# Patient Record
Sex: FEMALE | Race: BLACK OR AFRICAN AMERICAN | NOT HISPANIC OR LATINO | URBAN - METROPOLITAN AREA
[De-identification: names, ages, dates, MRNs, and addresses within clinical notes are randomized per-mention and may not be internally consistent; named-entity substitution may affect disease eponyms.]

---

## 2024-04-11 NOTE — PROGRESS NOTES
Subjective     Elvia Remy is a 23 y.o. female who presents for the following: Acne.     New patient in for virtual appointment for acne.     Review of Systems:  No other skin or systemic complaints other than what is documented elsewhere in the note.    The following portions of the chart were reviewed this encounter and updated as appropriate:       Skin Cancer History  No skin cancer on file.    Specialty Problems    None    Past Medical History:  Elvia Remy  has no past medical history on file.    Past Surgical History:  Elvia Remy  has no past surgical history on file.    Family History:  Patient family history is not on file.    Social History:  Elvia Remy  has no history on file for tobacco use, alcohol use, and drug use.    Allergies:  Patient has no known allergies.    Current Medications / CAM's:    Current Outpatient Medications:     clindamycin (Cleocin T) 1 % lotion, Apply topically once daily., Disp: 60 mL, Rfl: 1    tretinoin (Retin-A) 0.05 % cream, Apply topically once daily at bedtime. Apply a pea size amount nightly to entire face. Moisturize as needed, Disp: 20 g, Rfl: 1     Objective   Well appearing patient in no apparent distress; mood and affect are within normal limits.        Assessment/Plan   1. Acne vulgaris  Head - Anterior (Face)  Scattered comedones and inflammatory papulopustules.    Maintenance of Current Regimen: Please continue using the prescribed topical medications as directed. Consistency is key to maintaining the improvement achieved so far.    PLAN:  Start tretinoin 0.05% cream nightly  Start clindamycin 1% lotion daily   Continue OTC Panoxyl wash 10%  RTC in 2 months       Further Treatment Options: If there are no significant improvements or if your acne worsens, we may consider additional treatment options such as oral medications or in-office procedures. However, it is important to note that most cases of mild acne can be managed effectively with the current  regimen.    Skin Care Tips: Continue following a gentle cleansing routine, avoiding harsh scrubbing, and using non-comedogenic moisturizers to keep your skin hydrated without clogging pores.    Cleansing Routine: Gentle Cleanser: You have been using a mild, non-comedogenic cleanser to wash your face twice daily. This practice helps to remove excess oil, dirt, and impurities from your skin, minimizing the risk of pore blockage.    Lifestyle Measures: Avoiding Trigger Factors: We also discussed avoiding known triggers such as excessive sun exposure, touching or picking at the acne lesions, and using heavy or comedogenic cosmetic products.    Please feel free to contact our clinic if you have any questions or concerns between appointments. Remember, acne treatment requires patience and consistency.                 clindamycin (Cleocin T) 1 % lotion - Head - Anterior (Face)  Apply topically once daily.    Related Procedures  Follow Up In Dermatology - Established Patient    Related Medications  tretinoin (Retin-A) 0.05 % cream  Apply topically once daily at bedtime. Apply a pea size amount nightly to entire face. Moisturize as needed

## 2024-04-12 ENCOUNTER — TELEMEDICINE (OUTPATIENT)
Dept: DERMATOLOGY | Facility: CLINIC | Age: 24
End: 2024-04-12
Payer: COMMERCIAL

## 2024-04-12 DIAGNOSIS — L70.0 ACNE VULGARIS: Primary | ICD-10-CM

## 2024-04-12 PROCEDURE — 99204 OFFICE O/P NEW MOD 45 MIN: CPT | Performed by: NURSE PRACTITIONER

## 2024-04-12 RX ORDER — CLINDAMYCIN PHOSPHATE 10 UG/ML
LOTION TOPICAL DAILY
Qty: 60 ML | Refills: 1 | Status: SHIPPED | OUTPATIENT
Start: 2024-04-12

## 2024-04-12 RX ORDER — TRETINOIN 0.5 MG/G
CREAM TOPICAL NIGHTLY
Qty: 20 G | Refills: 1 | Status: SHIPPED | OUTPATIENT
Start: 2024-04-12 | End: 2025-04-12

## 2024-04-12 ASSESSMENT — DERMATOLOGY QUALITY OF LIFE (QOL) ASSESSMENT
WHAT SINGLE SKIN CONDITION LISTED BELOW IS THE PATIENT ANSWERING THE QUALITY-OF-LIFE ASSESSMENT QUESTIONS ABOUT: ACNE
RATE HOW EMOTIONALLY BOTHERED YOU ARE BY YOUR SKIN PROBLEM (FOR EXAMPLE, WORRY, EMBARRASSMENT, FRUSTRATION): 2
RATE HOW BOTHERED YOU ARE BY EFFECTS OF YOUR SKIN PROBLEMS ON YOUR ACTIVITIES (EG, GOING OUT, ACCOMPLISHING WHAT YOU WANT, WORK ACTIVITIES OR YOUR RELATIONSHIPS WITH OTHERS): 2
RATE HOW BOTHERED YOU ARE BY SYMPTOMS OF YOUR SKIN PROBLEM (EG, ITCHING, STINGING BURNING, HURTING OR SKIN IRRITATION): 1
DATE THE QUALITY-OF-LIFE ASSESSMENT WAS COMPLETED: 66942
RATE HOW BOTHERED YOU ARE BY SYMPTOMS OF YOUR SKIN PROBLEM (EG, ITCHING, STINGING BURNING, HURTING OR SKIN IRRITATION): 1
RATE HOW BOTHERED YOU ARE BY EFFECTS OF YOUR SKIN PROBLEMS ON YOUR ACTIVITIES (EG, GOING OUT, ACCOMPLISHING WHAT YOU WANT, WORK ACTIVITIES OR YOUR RELATIONSHIPS WITH OTHERS): 2
RATE HOW EMOTIONALLY BOTHERED YOU ARE BY YOUR SKIN PROBLEM (FOR EXAMPLE, WORRY, EMBARRASSMENT, FRUSTRATION): 2
WHAT SINGLE SKIN CONDITION LISTED BELOW IS THE PATIENT ANSWERING THE QUALITY-OF-LIFE ASSESSMENT QUESTIONS ABOUT: ACNE

## 2024-06-20 NOTE — PROGRESS NOTES
Subjective     Elvia Remy is a 23 y.o. female who presents for the following: Acne.   Patient last seen 04/2024 and prescribed to     Start tretinoin 0.05% cream nightly  Start clindamycin 1% lotion daily   Continue OTC Panoxyl wash 10%        Review of Systems:  No other skin or systemic complaints other than what is documented elsewhere in the note.    The following portions of the chart were reviewed this encounter and updated as appropriate:       Skin Cancer History  No skin cancer on file.    Specialty Problems    None    Past Medical History:  Elvia Remy  has no past medical history on file.    Past Surgical History:  Elvia Remy  has no past surgical history on file.    Family History:  Patient family history is not on file.    Social History:  Elvia Remy  has no history on file for tobacco use, alcohol use, and drug use.    Allergies:  Patient has no known allergies.    Current Medications / CAM's:    Current Outpatient Medications:     clindamycin (Cleocin T) 1 % lotion, Apply topically once daily., Disp: 60 mL, Rfl: 1    tretinoin (Retin-A) 0.025 % cream, Apply topically once daily at bedtime., Disp: 20 g, Rfl: 2     Objective   Well appearing patient in no apparent distress; mood and affect are within normal limits.      Assessment/Plan   1. Acne vulgaris  Head - Anterior (Face)  Patient is overall doing well, but still is getting new open comedones because she can only tolerate her tretinoin twice weekly due to dryness.  She believes it is improved since last visit, but not clear    Maintenance of Current Regimen: Please continue using the prescribed topical medications as directed. Consistency is key to maintaining the improvement achieved so far.    PLAN:  Continue clindamycin 1% lotion   Reduce tretinoin to 0.025% from 0.05%  Continue daily moisturizer   Continue BPO 10% wash daily    Further Treatment Options: If there are no significant improvements or if your acne worsens, we may consider  additional treatment options such as oral medications or in-office procedures. However, it is important to note that most cases of mild acne can be managed effectively with the current regimen.    Skin Care Tips: Continue following a gentle cleansing routine, avoiding harsh scrubbing, and using non-comedogenic moisturizers to keep your skin hydrated without clogging pores.    Cleansing Routine: Gentle Cleanser: You have been using a mild, non-comedogenic cleanser to wash your face twice daily. This practice helps to remove excess oil, dirt, and impurities from your skin, minimizing the risk of pore blockage.    Lifestyle Measures: Avoiding Trigger Factors: We also discussed avoiding known triggers such as excessive sun exposure, touching or picking at the acne lesions, and using heavy or comedogenic cosmetic products.    Please feel free to contact our clinic if you have any questions or concerns between appointments. Remember, acne treatment requires patience and consistency.                 tretinoin (Retin-A) 0.025 % cream - Head - Anterior (Face)  Apply topically once daily at bedtime.    Related Procedures  Follow Up In Dermatology - Established Patient  Follow Up In Dermatology - Established Patient    Related Medications  clindamycin (Cleocin T) 1 % lotion  Apply topically once daily.

## 2024-06-21 ENCOUNTER — APPOINTMENT (OUTPATIENT)
Dept: DERMATOLOGY | Facility: CLINIC | Age: 24
End: 2024-06-21
Payer: COMMERCIAL

## 2024-06-21 DIAGNOSIS — L70.0 ACNE VULGARIS: ICD-10-CM

## 2024-06-21 PROCEDURE — 99213 OFFICE O/P EST LOW 20 MIN: CPT | Performed by: NURSE PRACTITIONER

## 2024-06-21 RX ORDER — CLINDAMYCIN PHOSPHATE 10 UG/ML
LOTION TOPICAL DAILY
Qty: 60 ML | Refills: 1 | Status: SHIPPED | OUTPATIENT
Start: 2024-06-21

## 2024-06-21 RX ORDER — TRETINOIN 0.25 MG/G
CREAM TOPICAL NIGHTLY
Qty: 20 G | Refills: 2 | Status: SHIPPED | OUTPATIENT
Start: 2024-06-21 | End: 2025-06-21

## 2024-06-21 ASSESSMENT — DERMATOLOGY QUALITY OF LIFE (QOL) ASSESSMENT
DATE THE QUALITY-OF-LIFE ASSESSMENT WAS COMPLETED: 67012
WHAT SINGLE SKIN CONDITION LISTED BELOW IS THE PATIENT ANSWERING THE QUALITY-OF-LIFE ASSESSMENT QUESTIONS ABOUT: ACNE
RATE HOW BOTHERED YOU ARE BY EFFECTS OF YOUR SKIN PROBLEMS ON YOUR ACTIVITIES (EG, GOING OUT, ACCOMPLISHING WHAT YOU WANT, WORK ACTIVITIES OR YOUR RELATIONSHIPS WITH OTHERS): 2
RATE HOW BOTHERED YOU ARE BY SYMPTOMS OF YOUR SKIN PROBLEM (EG, ITCHING, STINGING BURNING, HURTING OR SKIN IRRITATION): 0 - NEVER BOTHERED
RATE HOW BOTHERED YOU ARE BY EFFECTS OF YOUR SKIN PROBLEMS ON YOUR ACTIVITIES (EG, GOING OUT, ACCOMPLISHING WHAT YOU WANT, WORK ACTIVITIES OR YOUR RELATIONSHIPS WITH OTHERS): 2
RATE HOW EMOTIONALLY BOTHERED YOU ARE BY YOUR SKIN PROBLEM (FOR EXAMPLE, WORRY, EMBARRASSMENT, FRUSTRATION): 3
WHAT SINGLE SKIN CONDITION LISTED BELOW IS THE PATIENT ANSWERING THE QUALITY-OF-LIFE ASSESSMENT QUESTIONS ABOUT: ACNE
RATE HOW BOTHERED YOU ARE BY SYMPTOMS OF YOUR SKIN PROBLEM (EG, ITCHING, STINGING BURNING, HURTING OR SKIN IRRITATION): 0 - NEVER BOTHERED
RATE HOW EMOTIONALLY BOTHERED YOU ARE BY YOUR SKIN PROBLEM (FOR EXAMPLE, WORRY, EMBARRASSMENT, FRUSTRATION): 3

## 2024-06-21 ASSESSMENT — PATIENT GLOBAL ASSESSMENT (PGA): WHAT IS THE PGA: PATIENT GLOBAL ASSESSMENT:  3 - MODERATE

## 2025-04-11 ASSESSMENT — PATIENT GLOBAL ASSESSMENT (PGA): WHAT IS THE PGA: PATIENT GLOBAL ASSESSMENT:  2 - MILD

## 2025-04-11 ASSESSMENT — DERMATOLOGY QUALITY OF LIFE (QOL) ASSESSMENT
RATE HOW EMOTIONALLY BOTHERED YOU ARE BY YOUR SKIN PROBLEM (FOR EXAMPLE, WORRY, EMBARRASSMENT, FRUSTRATION): 3
WHAT SINGLE SKIN CONDITION LISTED BELOW IS THE PATIENT ANSWERING THE QUALITY-OF-LIFE ASSESSMENT QUESTIONS ABOUT: ACNE
RATE HOW EMOTIONALLY BOTHERED YOU ARE BY YOUR SKIN PROBLEM (FOR EXAMPLE, WORRY, EMBARRASSMENT, FRUSTRATION): 3
WHAT SINGLE SKIN CONDITION LISTED BELOW IS THE PATIENT ANSWERING THE QUALITY-OF-LIFE ASSESSMENT QUESTIONS ABOUT: ACNE
RATE HOW BOTHERED YOU ARE BY SYMPTOMS OF YOUR SKIN PROBLEM (EG, ITCHING, STINGING BURNING, HURTING OR SKIN IRRITATION): 0 - NEVER BOTHERED
RATE HOW BOTHERED YOU ARE BY SYMPTOMS OF YOUR SKIN PROBLEM (EG, ITCHING, STINGING BURNING, HURTING OR SKIN IRRITATION): 0 - NEVER BOTHERED
RATE HOW BOTHERED YOU ARE BY EFFECTS OF YOUR SKIN PROBLEMS ON YOUR ACTIVITIES (EG, GOING OUT, ACCOMPLISHING WHAT YOU WANT, WORK ACTIVITIES OR YOUR RELATIONSHIPS WITH OTHERS): 4
RATE HOW BOTHERED YOU ARE BY EFFECTS OF YOUR SKIN PROBLEMS ON YOUR ACTIVITIES (EG, GOING OUT, ACCOMPLISHING WHAT YOU WANT, WORK ACTIVITIES OR YOUR RELATIONSHIPS WITH OTHERS): 4

## 2025-04-18 ENCOUNTER — APPOINTMENT (OUTPATIENT)
Dept: DERMATOLOGY | Facility: CLINIC | Age: 25
End: 2025-04-18
Payer: COMMERCIAL

## 2025-04-18 DIAGNOSIS — L70.0 ACNE VULGARIS: Primary | ICD-10-CM

## 2025-04-18 PROCEDURE — 99213 OFFICE O/P EST LOW 20 MIN: CPT | Performed by: NURSE PRACTITIONER

## 2025-04-18 RX ORDER — TRETINOIN 0.5 MG/G
CREAM TOPICAL
Qty: 20 G | Refills: 1 | Status: SHIPPED | OUTPATIENT
Start: 2025-04-18

## 2025-04-18 RX ORDER — CLINDAMYCIN PHOSPHATE 10 UG/ML
LOTION TOPICAL DAILY
Qty: 60 ML | Refills: 1 | Status: SHIPPED | OUTPATIENT
Start: 2025-04-18

## 2025-04-18 NOTE — PROGRESS NOTES
Subjective     Elvia Remy is a 24 y.o. female who presents for the following: No chief complaint on file..   Established patient in for virtual follow up last seen 06/2024 and prescribed to   Continue clindamycin 1% lotion   Reduce tretinoin to 0.025% from 0.05%  Continue daily moisturizer   Continue BPO 10% wash daily    Review of Systems:  No other skin or systemic complaints other than what is documented elsewhere in the note.    The following portions of the chart were reviewed this encounter and updated as appropriate:       Skin Cancer History  Biopsy Log Book  No skin cancers from Specimen Tracking.    Additional History      Specialty Problems    None    Past Medical History:  Elvia Remy  has no past medical history on file.    Past Surgical History:  Elvia Remy  has no past surgical history on file.    Family History:  Patient family history is not on file.    Social History:  Elvia Remy  has no history on file for tobacco use, alcohol use, and drug use.    Allergies:  Patient has no known allergies.    Current Medications / CAM's:  Current Medications[1]     Objective   Well appearing patient in no apparent distress; mood and affect are within normal limits.      Assessment/Plan   Skin Exam  1. ACNE VULGARIS  Head - Anterior (Face)  Patient is overall doing well, but still is getting new open comedones because she can only tolerate her tretinoin twice weekly due to dryness.    Maintenance of Current Regimen: Please continue using the prescribed topical medications as directed. Consistency is key to maintaining the improvement achieved so far.    PLAN:  Continue clindamycin 1% lotion   Reduced tretinoin to 0.025% and open comedones persist.  We will attempt to increase dosing to 0.05% again and reevaluate   Continue daily moisturizer   Continue BPO 10% wash daily      Related Medications  clindamycin (Cleocin T) 1 % lotion  Apply topically once daily.   Virtual or Telephone Consent    An interactive  audio and video telecommunication system which permits real time communications between the patient (at the originating site) and provider (at the distant site) was utilized to provide this telehealth service.   Verbal consent was requested and obtained from Elvia Jonel on this date, 04/18/25 for a telehealth visit and the patient's location was confirmed at the time of the visit.          [1]   Current Outpatient Medications:     clindamycin (Cleocin T) 1 % lotion, Apply topically once daily., Disp: 60 mL, Rfl: 1

## 2025-04-18 NOTE — Clinical Note
Patient is overall doing well, but still is getting new open comedones because she can only tolerate her tretinoin twice weekly due to dryness.

## 2025-04-18 NOTE — Clinical Note
Maintenance of Current Regimen: Please continue using the prescribed topical medications as directed. Consistency is key to maintaining the improvement achieved so far.    PLAN:  Continue clindamycin 1% lotion   Reduced tretinoin to 0.025% and open comedones persist.  We will attempt to increase dosing to 0.05% again and reevaluate   Continue daily moisturizer   Continue BPO 10% wash daily

## 2025-05-08 ENCOUNTER — TELEPHONE (OUTPATIENT)
Dept: DERMATOLOGY | Facility: CLINIC | Age: 25
End: 2025-05-08
Payer: COMMERCIAL

## 2025-05-08 DIAGNOSIS — L70.0 ACNE VULGARIS: ICD-10-CM

## 2025-05-08 RX ORDER — TRETINOIN 0.5 MG/G
CREAM TOPICAL
Qty: 20 G | Refills: 1 | Status: SHIPPED | OUTPATIENT
Start: 2025-05-08

## 2025-05-08 RX ORDER — CLINDAMYCIN PHOSPHATE 10 UG/ML
LOTION TOPICAL DAILY
Qty: 60 ML | Refills: 1 | Status: SHIPPED | OUTPATIENT
Start: 2025-05-08